# Patient Record
(demographics unavailable — no encounter records)

---

## 2025-01-22 NOTE — PLAN
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 can wait and watch mammo done 05/2024 n/v 1 yr or PRN

## 2025-01-22 NOTE — PHYSICAL EXAM
[Chaperone Present] : A chaperone was present in the examining room during all aspects of the physical examination [82176] : A chaperone was present during the pelvic exam. [Soft] : soft [Non-tender] : non-tender [Non-distended] : non-distended [FreeTextEntry7] : fibroid palpable on abdominal exam, feels approximately 10 cm in diameter [Examination Of The Breasts] : a normal appearance [Normal] : normal [No Masses] : no breast masses were palpable [Enlarged ___ wks] : enlarged [unfilled] ~Uweeks

## 2025-01-22 NOTE — PROCEDURE
[Transvaginal Ultrasound] : transvaginal ultrasound [FreeTextEntry3] : small uterus with thin endoemetrium underlying a solitary fibroid measuring 9.2 by 7.9 cm.  no FF in the pelvis, ovaries not seen

## 2025-01-22 NOTE — HISTORY OF PRESENT ILLNESS
[FreeTextEntry1] : pt here for [employer] also to evaluate her fibroid uterus pt has been menopausal for several years c/o severe vaginal dryness and dyspareunia recently had severe diarrhea during that time developed vaginal discharge that had a strange odor

## 2025-02-12 NOTE — REASON FOR VISIT
[FreeTextEntry1] : Telehealth visit  Verbal consent given on 02/12/2025 at 15:45 by LIS OSULLIVAN, ~  ~. pt at home  in office

## 2025-02-12 NOTE — DISCUSSION/SUMMARY
[FreeTextEntry1] : Lipid levels discussed  see no reason to start meds rec Calcium score  diet and exeercise discussed